# Patient Record
Sex: FEMALE | Race: WHITE
[De-identification: names, ages, dates, MRNs, and addresses within clinical notes are randomized per-mention and may not be internally consistent; named-entity substitution may affect disease eponyms.]

---

## 2017-04-08 ENCOUNTER — HOSPITAL ENCOUNTER (EMERGENCY)
Dept: HOSPITAL 74 - JERFT | Age: 42
Discharge: HOME | End: 2017-04-08
Payer: COMMERCIAL

## 2017-04-08 VITALS — HEART RATE: 96 BPM | DIASTOLIC BLOOD PRESSURE: 61 MMHG | TEMPERATURE: 98.2 F | SYSTOLIC BLOOD PRESSURE: 132 MMHG

## 2017-04-08 VITALS — BODY MASS INDEX: 40.3 KG/M2

## 2017-04-08 DIAGNOSIS — B95.0: ICD-10-CM

## 2017-04-08 DIAGNOSIS — J02.0: Primary | ICD-10-CM

## 2017-04-08 NOTE — PDOC
History of Present Illness





- General


Chief Complaint: Sore Throat


Stated Complaint: THROAT PAIN


Time Seen by Provider: 04/08/17 09:25


History Source: Patient


Exam Limitations: No Limitations





- History of Present Illness


Initial Comments: 





04/08/17 09:35


Here with complaints of fevers, chills, general body aches and nausea. Sore 

throat pain 2-3 days. States has had congestion and problems for over a month 

but states the past couple days has become much worse.


Timing/Duration: reports: just prior to arrival, constant, getting worse


Associated Symptoms: reports: fever/chills, headache, nasal congestion, nasal 

drainage, sore throat





Past History





- Travel


Traveled outside of the country in the last 30 days: No


Close contact w/someone who was outside of country & ill: No





- Past Medical History


Allergies/Adverse Reactions: 


 Allergies











Allergy/AdvReac Type Severity Reaction Status Date / Time


 


metronidazole [From Flagyl] Allergy Mild Hives Verified 04/08/17 09:10


 


fluconazole [From Diflucan] Allergy  Rash Verified 04/08/17 09:11











Home Medications: 


Ambulatory Orders





Tramadol HCl 50 mg PO BID PRN 07/30/15 








Anemia: No


Asthma: No


Cancer: No


Cardiac Disorders: No


CVA: No


COPD: No


CHF: No


Dementia: No


Diabetes: No


GI Disorders: No


 Disorders: No


HTN: No


Hypercholesterolemia: No


Liver Disease: No


Seizures: No


Thyroid Disease: No





- Surgical History


Abdominal Surgery: No


Appendectomy: No


Cardiac Surgery: No


Cholecystectomy: No


Lung Surgery: No


Neurologic Surgery: No


Orthopedic Surgery: No





- Immunization History


Immunization Up to Date: No





- Psycho/Social/Smoking Cessation Hx


Anxiety: No


Suicidal Ideation: No


Smoking History: Never smoked


Have you smoked in the past 12 months: No


Hx Alcohol Use: No


Drug/Substance Use Hx: No


Substance Use Type: None


Hx Substance Use Treatment: No





**Review of Systems





- Review of Systems


Able to Perform ROS?: Yes


Is the patient limited English proficient: Yes


Constitutional: Yes: Symptoms Reported, See HPI, Chills, Fever, Loss of Appetite

, Malaise


HEENTM: Yes: Symptoms Reported, See HPI, Throat Pain, Difficulty Swallowing


Respiratory: Yes: Symptoms reported


Musculoskeletal: No: Symptoms Reported


Integumentary: Yes: Symptoms Reported


All Other Systems: Reviewed and Negative





*Physical Exam





- Vital Signs


 Last Vital Signs











Temp Pulse Resp BP Pulse Ox


 


 98.2 F   96 H  20   132/61   97 


 


 04/08/17 09:08  04/08/17 09:08  04/08/17 09:08  04/08/17 09:08  04/08/17 09:08














- Physical Exam


General Appearance: Yes: Appropriately Dressed, Apparent Distress


HEENT: positive: RAMO, TMs Normal, Muffled/Hoarse voice, Pharyngeal Erythema, 

Rhinorrhea, Sinus Tenderness.  negative: Tonsillar Exudate


Respiratory/Chest: positive: Lungs Clear, Normal Breath Sounds, Wheezing


Cardiovascular: positive: Regular Rate


Gastrointestinal/Abdominal: positive: Soft.  negative: Tender


Musculoskeletal: positive: Normal Inspection, CVA Tenderness


Extremity: positive: Normal Capillary Refill, Normal Inspection, Normal Range 

of Motion


Integumentary: positive: Dry, Warm, Pale


Neurologic: positive: CNs II-XII NML intact, Fully Oriented, Alert, Normal Mood/

Affect, Normal Response, Motor Strength 5/5





Progress Note





- Progress Note


Progress Note: 


Respiratory infection, will check influenza and strep





Medical Decision Making





- Medical Decision Making





04/08/17 10:36


Rapid strep test positive, influenza negative. Bicillin 1.2 million units IM 

even with no reaction after 30 minutes





*DC/Admit/Observation/Transfer


Diagnosis at time of Disposition: 


 Strep pharyngitis





- Discharge Dispostion


Disposition: HOME


Condition at time of disposition: Stable


Admit: No





- Patient Instructions


Printed Discharge Instructions:  DI for Strep Throat


Additional Instructions: 


Rest, drink lots of fluids: Teas, water, soups


Eat cold things: Ice cream, ice pops,  ice chips


Saltwater gargles


Steamy showers/seem to face break up mucus





Avoid contact with others until fevers and pain resolved


Lots of handwashing and good hygiene, this is contagious





You have been treated with Bicillin LA 1.2 million units injection which is a 

one-time treatment for strep pharyngitis.


You will not need to take any further antibiotics. 


Tylenol or Motrin for fever and pain





Followup with private physician in one to 2 days as needed if not improving


Return to emergency department for worsened symptoms, fevers, dehydration

## 2017-05-01 ENCOUNTER — HOSPITAL ENCOUNTER (EMERGENCY)
Dept: HOSPITAL 74 - JERFT | Age: 42
Discharge: HOME | End: 2017-05-01
Payer: COMMERCIAL

## 2017-05-01 VITALS — HEART RATE: 87 BPM | TEMPERATURE: 97.9 F | SYSTOLIC BLOOD PRESSURE: 146 MMHG | DIASTOLIC BLOOD PRESSURE: 77 MMHG

## 2017-05-01 VITALS — BODY MASS INDEX: 40.3 KG/M2

## 2017-05-01 DIAGNOSIS — B37.3: Primary | ICD-10-CM

## 2017-05-01 LAB
APPEARANCE UR: (no result)
BACTERIA #/AREA URNS HPF: (no result) /HPF
BILIRUB UR STRIP.AUTO-MCNC: NEGATIVE MG/DL
COLOR UR: YELLOW
KETONES UR QL STRIP: NEGATIVE
LEUKOCYTE ESTERASE UR QL STRIP.AUTO: (no result)
MUCOUS THREADS URNS QL MICRO: (no result)
NITRITE UR QL STRIP: NEGATIVE
PH UR: 5 [PH] (ref 5–8)
PROT UR QL STRIP: NEGATIVE
PROT UR QL STRIP: NEGATIVE
RBC # BLD AUTO: 12 /HPF (ref 0–3)
RBC # UR STRIP: NEGATIVE /UL
SP GR UR: 1.02 (ref 1–1.03)
UROBILINOGEN UR STRIP-MCNC: NEGATIVE E.U./DL (ref 0.2–1)
WBC # UR AUTO: 7 /HPF (ref 3–5)

## 2017-05-01 NOTE — PDOC
History of Present Illness





- General


Chief Complaint: Vaginal Sxs


Stated Complaint: SICK


Time Seen by Provider: 05/01/17 09:34


History Source: Patient


Exam Limitations: No Limitations





- History of Present Illness


Initial Comments: 


CHIEF COMPLAINT:  42 y/o female c/o vaginal discharge and itching for the past 

2 days. 





HISTORY OF PRESENT ILLNESS:  The patient has history of recurrent yeast and BV 

infections.  She isn't sure what this is but describes thick white discharge, 

vaginal irritation and odor.  She also admits to unprotected sex with a new 

partner about 1 week ago.  She denies all other symptoms. 





Vital signs on arrival are within normal limits. 





REVIEW OF SYSTEMS:


GENERAL/CONSTITUTIONAL: No fever/chills. No weakness. No weight change.


GASTROINTESTINAL: No abd pain, nausea, vomiting, diarrhea. 


GENITOURINARY: No dysuria, frequency, or change in urination.


VAGINAL:  +thick, white discharge with foul odor and vaginal irritation. 


MUSCULOSKELETAL: No joint or muscle swelling or pain. No neck or back pain.


SKIN: No rash or easy bruising.


NEUROLOGIC: No headache, vertigo, loss of consciousness, or loss of sensation.





PHYSICAL EXAM:


GENERAL: The patient is awake, alert, and fully oriented, in no acute distress.

  She is morbidly obese. 


ABDOMEN: Soft, non-distended, non-tender even to deep palpation, no 

hepatomegaly or splenomegaly, no masses.


VAGINAL:  Thick white discharge seen in vagina without foul odor.  External 

genitalia is mildly erythematous. 


EXTREMITIES: Normal range of motion, no edema.


NEUROLOGICAL: Normal speech, normal gait. CN II-XII grossly intact.


SKIN: Warm, dry, normal turgor, no rashes or lesions noted.











Past History





- Past Medical History


Allergies/Adverse Reactions: 


 Allergies











Allergy/AdvReac Type Severity Reaction Status Date / Time


 


metronidazole [From Flagyl] Allergy Mild Hives Verified 05/01/17 08:53


 


fluconazole [From Diflucan] Allergy  Rash Verified 05/01/17 08:53











Home Medications: 


Ambulatory Orders





Miconazole Nitrate [Monistat 3] 1 each VG HS #1 kit 05/01/17 








Anemia: No


Asthma: No


Cancer: No


Cardiac Disorders: No


CVA: No


COPD: No


CHF: No


Dementia: No


Diabetes: No


GI Disorders: No


 Disorders: No


HTN: No


Hypercholesterolemia: No


Liver Disease: No


Seizures: No


Thyroid Disease: No


Other medical history: ARTHRITIS, "PINCHED NERVE IN BACK"





- Surgical History


Abdominal Surgery: No


Appendectomy: No


Cardiac Surgery: No


Cholecystectomy: No


Lung Surgery: No


Neurologic Surgery: No


Orthopedic Surgery: No





- Immunization History


Immunization Up to Date: No





- Psycho/Social/Smoking Cessation Hx


Anxiety: No


Suicidal Ideation: No


Smoking History: Never smoked


Have you smoked in the past 12 months: No


Hx Alcohol Use: No


Drug/Substance Use Hx: No


Substance Use Type: None


Hx Substance Use Treatment: No





*Physical Exam





- Vital Signs


 Last Vital Signs











Temp Pulse Resp BP Pulse Ox


 


 97.9 F   87   19   146/77   98 


 


 05/01/17 08:54  05/01/17 08:54  05/01/17 08:54  05/01/17 08:54  05/01/17 08:54














Medical Decision Making





- Medical Decision Making


A/P:  42 y/o female with what appears to be a yeast infection on exam.  Plan is 

as follows:





1. UA/hcg/culture


2. Genital culture


3. GC/chlamydia





The patient would prefer to wait for GC/chlamydia results and not be treated 

empirically. 





The patient states one time she took diflucan and got hives and then another 

time she took it and had no reaction.  Suggested monistat 3 for tx of the yeast 

infection instead of diflucan.  Instructed her to return to the ER with any 

worsening or concerning symptoms.  Informed her that we will call within 48-72 

hours with any positive results. 





The patient verbalizes understanding of all instructions, has no further 

questions and is awaiting discharge.

















*DC/Admit/Observation/Transfer


Diagnosis at time of Disposition: 


 Yeast infection





- Discharge Dispostion


Disposition: HOME


Condition at time of disposition: Good





- Prescriptions


Prescriptions: 


Miconazole Nitrate [Monistat 3] 1 each  HS #1 kit





- Referrals


Referrals: 


Filiberto Avery MD [Primary Care Provider] - 





- Patient Instructions


Printed Discharge Instructions:  DI for Vaginal Yeast Infection


Additional Instructions: 


Discharge Instructions:


-You have a yeast infection


-Sick you are allergic to Diflucan by mouth, please buy over the counter 

Monistat 3 and try treating your infection with that


-You were checked for GC/chlamydia in the ER; if results are positive you will 

receive a call in 48-72 hours. 


-Return to the ER with any worsening or concerning symptoms

## 2018-06-22 ENCOUNTER — HOSPITAL ENCOUNTER (EMERGENCY)
Dept: HOSPITAL 74 - JERFT | Age: 43
Discharge: HOME | End: 2018-06-22
Payer: COMMERCIAL

## 2018-06-22 VITALS — BODY MASS INDEX: 41.9 KG/M2

## 2018-06-22 VITALS — DIASTOLIC BLOOD PRESSURE: 59 MMHG | SYSTOLIC BLOOD PRESSURE: 107 MMHG | TEMPERATURE: 98 F | HEART RATE: 81 BPM
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
## 2018-06-22 DIAGNOSIS — N39.0: Primary | ICD-10-CM

## 2018-06-22 DIAGNOSIS — S80.11XA: ICD-10-CM

## 2018-06-22 LAB
APPEARANCE UR: CLEAR
BACTERIA #/AREA URNS HPF: (no result) /HPF
BILIRUB UR STRIP.AUTO-MCNC: NEGATIVE MG/DL
COLOR UR: YELLOW
EPITH CASTS URNS QL MICRO: (no result) /HPF
HCG UR QL: NEGATIVE
KETONES UR QL STRIP: NEGATIVE
LEUKOCYTE ESTERASE UR QL STRIP.AUTO: (no result)
MUCOUS THREADS URNS QL MICRO: (no result)
NITRITE UR QL STRIP: NEGATIVE
PH UR: 5 [PH] (ref 5–8)
PROT UR QL STRIP: NEGATIVE
PROT UR QL STRIP: NEGATIVE
SP GR UR: 1.03 (ref 1–1.03)
UROBILINOGEN UR STRIP-MCNC: NEGATIVE MG/DL (ref 0.2–1)

## 2018-06-22 NOTE — PDOC
Rapid Medical Evaluation


Time Seen by Provider: 06/22/18 20:18


Medical Evaluation: 


 Allergies











Allergy/AdvReac Type Severity Reaction Status Date / Time


 


metronidazole [From Flagyl] Allergy Mild Hives Verified 05/01/17 08:53


 


fluconazole [From Diflucan] Allergy  Rash Verified 05/01/17 08:53











06/22/18 20:23


I have performed a brief in-person evaluation of this patient.





The patient presents with a chief complaint of: Right ant prox leg ecchymosis s/

p fall x 3 weeks ago then x2d ago when she was getting off a ride in the park 

in [place]. 


Pt also c/o "yeast infection" vaginal burn/itch w/o discharge/fever/chills/

flank pain. 


LMP: 06/20/2018





Pertinent physical exam findings: Abd soft/NT/ND





I have ordered the following: UA, UC, Upreg





The patient will proceed to the ED for further evaluation

## 2018-06-22 NOTE — PDOC
History of Present Illness





- General


Chief Complaint: Vaginal Sxs


Stated Complaint: fall/vaginal itching


Time Seen by Provider: 06/22/18 20:18





- History of Present Illness


Initial Comments: 


42-year-old female without comorbidities presents for evaluation of dysuria 2 

days. She states she was recently on an antibiotic Omnicef for sinusitis.


06/22/18 20:58








Past History





- Past Medical History


Allergies/Adverse Reactions: 


 Allergies











Allergy/AdvReac Type Severity Reaction Status Date / Time


 


metronidazole [From Flagyl] Allergy Mild Hives Verified 06/22/18 20:27


 


fluconazole [From Diflucan] Allergy  Rash Verified 06/22/18 20:27











Home Medications: 


Ambulatory Orders





Sulfamethoxazole/Trimethoprim [Bactrim Ds -] 1 tab PO BID #14 tablet 06/22/18 








Anemia: No


Asthma: No


Cancer: No


Cardiac Disorders: No


CVA: No


COPD: No


CHF: No


Dementia: No


Diabetes: No


GI Disorders: No


 Disorders: No


HTN: No


Hypercholesterolemia: No


Liver Disease: No


Seizures: No


Thyroid Disease: No





- Surgical History


Abdominal Surgery: No


Appendectomy: No


Cardiac Surgery: No


Cholecystectomy: No


Lung Surgery: No


Neurologic Surgery: No


Orthopedic Surgery: No





- Immunization History


Immunization Up to Date: No





- Suicide/Smoking/Psychosocial Hx


Smoking History: Never smoked


Have you smoked in the past 12 months: No


Information on smoking cessation initiated: No


Hx Alcohol Use: No


Drug/Substance Use Hx: No


Substance Use Type: None


Hx Substance Use Treatment: No





**Review of Systems





- Review of Systems


: Yes: See HPI, Burning, Dysuria


All Other Systems: Reviewed and Negative





*Physical Exam





- Vital Signs


 Last Vital Signs











Temp Pulse Resp BP Pulse Ox


 


 98.0 F   81   16   107/59   98 


 


 06/22/18 20:25  06/22/18 20:25  06/22/18 20:25  06/22/18 20:25  06/22/18 20:25














- Physical Exam


Comments: 


GENERAL: The patient is awake, alert, and fully oriented, in no acute distress.


HEAD: Normal with no signs of trauma.


EYES:  conjunctiva clear.


EXTREMITIES: Normal range of motion, no edema.  No clubbing or cyanosis. No 

cords, erythema, or tenderness.


NEUROLOGICAL: Cranial nerves II through XII grossly intact.  Normal speech, 

normal gait.


PSYCH: Normal mood, normal affect.


SKIN: Warm, Dry, normal turgor, no rashes or lesions noted.


06/22/18 20:58





06/22/18 22:46


Right knee skin color and temperature are normal there is mild ecchymosis about 

the medial laxity second of the proximal tibia. Which is minimally tender. She 

has full range of motion no joint line tenderness most of her tenderness is 

from the patellofemoral joint she has mild patellofemoral crepitation. Thigh 

and calf is soft and nontender she has no gross sensorimotor deficits negative 

straight leg raise test she is neurovascularly intact. No evidence of 

instability.


06/22/18 22:52


Left foot skin color and temperature are normal there is full range of motion 

mild tenderness over the base of the fifth metatarsal and the lateral aspect of 

the foot. No gross sensory and motor deficits.





Medical Decision Making





- Medical Decision Making


X-rays of further normal. She has a knee contusion a foot sprain any urinary 

tract infection. I will treat her with Bactrim.


06/22/18 22:53








*DC/Admit/Observation/Transfer


Diagnosis at time of Disposition: 


 UTI (urinary tract infection), Contusion, knee and lower leg, Sprain of foot








- Discharge Dispostion


Disposition: HOME


Condition at time of disposition: Stable


Decision to Admit order: No





- Prescriptions


Prescriptions: 


Sulfamethoxazole/Trimethoprim [Bactrim Ds -] 1 tab PO BID #14 tablet





- Referrals


Referrals: 


Arielle Leger MD [Primary Care Provider] - 


Alberto Lockwood MD [Staff Physician] - 





- Patient Instructions


Printed Discharge Instructions:  Contusion, Urinary Tract Infection


Additional Instructions: 


Please take all the antibiotics as prescribed. Return to the emergency room 

should her symptoms worsen or go unresolved. Follow-up with her primary care 

doctor for further evaluation and treatment options. Also follow-up with 

orthopedics for reevaluation of your knee and foot. Follow-up should be done 

within next 1-2 days.





- Post Discharge Activity